# Patient Record
Sex: FEMALE | Race: WHITE | Employment: FULL TIME | URBAN - METROPOLITAN AREA
[De-identification: names, ages, dates, MRNs, and addresses within clinical notes are randomized per-mention and may not be internally consistent; named-entity substitution may affect disease eponyms.]

---

## 2017-04-13 ENCOUNTER — HOSPITAL ENCOUNTER (OUTPATIENT)
Dept: MAMMOGRAPHY | Age: 62
Discharge: HOME | End: 2017-04-13
Payer: COMMERCIAL

## 2017-04-13 DIAGNOSIS — Z12.31 VISIT FOR SCREENING MAMMOGRAM: ICD-10-CM

## 2017-04-13 PROCEDURE — 77067 SCR MAMMO BI INCL CAD: CPT

## 2017-12-18 LAB
CREATININE, EXTERNAL: 0.83
LDL-C, EXTERNAL: 65

## 2019-01-24 ENCOUNTER — OFFICE VISIT (OUTPATIENT)
Dept: FAMILY MEDICINE CLINIC | Age: 64
End: 2019-01-24

## 2019-01-24 VITALS
OXYGEN SATURATION: 99 % | DIASTOLIC BLOOD PRESSURE: 77 MMHG | TEMPERATURE: 98.9 F | RESPIRATION RATE: 16 BRPM | SYSTOLIC BLOOD PRESSURE: 139 MMHG | BODY MASS INDEX: 23.74 KG/M2 | WEIGHT: 134 LBS | HEIGHT: 63 IN | HEART RATE: 92 BPM

## 2019-01-24 DIAGNOSIS — F41.1 GAD (GENERALIZED ANXIETY DISORDER): Primary | ICD-10-CM

## 2019-01-24 DIAGNOSIS — R30.0 DYSURIA: ICD-10-CM

## 2019-01-24 DIAGNOSIS — Z76.89 ENCOUNTER TO ESTABLISH CARE: ICD-10-CM

## 2019-01-24 DIAGNOSIS — I10 ESSENTIAL HYPERTENSION: ICD-10-CM

## 2019-01-24 LAB
BILIRUB UR QL STRIP: NEGATIVE
GLUCOSE UR-MCNC: NEGATIVE MG/DL
KETONES P FAST UR STRIP-MCNC: NEGATIVE MG/DL
PH UR STRIP: 6.5 [PH] (ref 4.6–8)
PROT UR QL STRIP: NEGATIVE
SP GR UR STRIP: 1.01 (ref 1–1.03)
UA UROBILINOGEN AMB POC: NORMAL (ref 0.2–1)
URINALYSIS CLARITY POC: CLEAR
URINALYSIS COLOR POC: NORMAL
URINE BLOOD POC: NEGATIVE
URINE LEUKOCYTES POC: NEGATIVE
URINE NITRITES POC: NEGATIVE

## 2019-01-24 RX ORDER — LISINOPRIL 5 MG/1
TABLET ORAL
COMMUNITY
Start: 2019-01-23

## 2019-01-24 RX ORDER — BUSPIRONE HYDROCHLORIDE 5 MG/1
5 TABLET ORAL 2 TIMES DAILY
Qty: 60 TAB | Refills: 1 | Status: SHIPPED | OUTPATIENT
Start: 2019-01-24

## 2019-01-24 RX ORDER — FLUTICASONE PROPIONATE 50 MCG
2 SPRAY, SUSPENSION (ML) NASAL DAILY
COMMUNITY

## 2019-01-24 RX ORDER — GLUCOSAMINE SULFATE 1500 MG
POWDER IN PACKET (EA) ORAL DAILY
COMMUNITY

## 2019-01-24 RX ORDER — ALENDRONATE SODIUM 70 MG/1
TABLET ORAL
COMMUNITY
Start: 2019-01-19

## 2019-01-24 RX ORDER — BUSPIRONE HYDROCHLORIDE 5 MG/1
5 TABLET ORAL DAILY
COMMUNITY
Start: 2019-01-23 | End: 2019-01-24 | Stop reason: SDUPTHER

## 2019-01-24 RX ORDER — DIAZEPAM 5 MG/1
5 TABLET ORAL
COMMUNITY
Start: 2018-11-29

## 2019-01-24 RX ORDER — GLUCOSAMINE SULFATE 1500 MG
POWDER IN PACKET (EA) ORAL DAILY
COMMUNITY
End: 2019-01-24

## 2019-01-24 RX ORDER — NITROFURANTOIN 25; 75 MG/1; MG/1
CAPSULE ORAL
COMMUNITY
Start: 2018-10-30 | End: 2019-01-24 | Stop reason: ALTCHOICE

## 2019-01-24 NOTE — PATIENT INSTRUCTIONS
Learning About Generalized Anxiety Disorder  What is generalized anxiety disorder? We all worry. It's a normal part of life. But when you have generalized anxiety disorder, you worry about lots of things and have a hard time stopping your worry. This worry or anxiety interferes with your relationships, work, and life. What causes it? The cause is not known. But it may be passed down through families. What are the symptoms? You may feel anxious or worry most days about things like work, relationships, health, or money. You may worry about things that are unlikely to happen. You find it hard to stop or control the worry. Because you worry a lot and try hard to stop worrying, you may feel restless, tired, tense, or cranky. You may also find it hard to think or sleep. And you may have headaches or an upset stomach. How is it diagnosed? Your doctor will ask about your health and how often you worry or feel anxious. He or she may ask about other symptoms, like whether you:  · Feel restless. · Feel tired. · Have a hard time thinking or feel that your mind goes blank. · Feel cranky. · Have tense muscles. · Have sleep problems. A physical exam and tests can help make sure that your symptoms aren't caused by a different condition, such as a thyroid problem. How is it treated? Counseling and medicine can both work to treat anxiety. The two are often used along with lifestyle changes. Cognitive-behavioral therapy (CBT) is a type of counseling that's used to help treat anxiety. In CBT, you learn how to notice and replace thoughts that make you feel worried. It also can help you learn how to relax when you worry. Medicines can help. These medicines are often also used for depression. Selective serotonin reuptake inhibitors (SSRIs) are often tried first. But there are other medicines that your doctor may use. You may need to try a few medicines to find one that works well.   Many people feel better by getting regular exercise, eating healthy meals, and getting good sleep. Mindfulness--focusing on things that happen in the present moment--also can help reduce your anxiety. What can you expect when you have it? Having anxiety can be upsetting. Some people might feel less worried and stressed after a couple of months of treatment. But for other people, it might take longer to feel better. Reaching out to people for help is important. Try not to isolate yourself. Let your family and friends help you. Find someone you can trust and confide in. Talk to that person. When you know what anxiety is--and how you can get help for it--you can start to learn new ways of thinking. This can help you cope and work through your anxiety. Follow-up care is a key part of your treatment and safety. Be sure to make and go to all appointments, and call your doctor if you are having problems. It's also a good idea to know your test results and keep a list of the medicines you take. Where can you learn more? Go to http://ana lilia-zack.info/. Enter G110 in the search box to learn more about \"Learning About Generalized Anxiety Disorder. \"  Current as of: September 11, 2018  Content Version: 11.9  © 8838-5388 [employer]. Care instructions adapted under license by [employer] (which disclaims liability or warranty for this information). If you have questions about a medical condition or this instruction, always ask your healthcare professional. [address] any warranty or liability for your use of this information.

## 2019-01-24 NOTE — PROGRESS NOTES
[address] [facility] Note  HPI:   Lulu Del Real is a 58 y.o. female who presents to establish care. Previous provider: Dr. Niharika Nolasco. Switching PCP as provider is leaving. Chief Complaint   Patient presents with    New Patient     establish care. previous PCP is leaving office. seen by Dr. Rosie Paez  She is an 58 y.o. female who presents with new evaluation and treatment of of anxiety disorder. She has the following anxiety symptoms: racing thoughts, physical tightness, generalized worry. Onset of symptoms was lifelong, worsened 1-2 years ago, stable since this time. She denies current suicidal and homicidal ideation. Family history significant for anxiety and alcoholism. Possible organic causes contributing are: none. Risk factors: negative life event estranged from son not by her choice. Previous treatment includes buspar, Xanax, Valium, lexapro and group therapy and individual therapy. She complains of the following side effects from the treatment: Reports grand mal seizure from Lexapro, she is fearful to take other SSRI. She has most recently been taking Buspar 5 mg daily, Valium 5 mg almost every night. Cardiovascular Review:  The patient has hypertension. Diet and Lifestyle: not attempting to follow a low fat, low cholesterol diet  Home BP Monitoring: is not measured at home. Pertinent ROS: taking medications as instructed, no medication side effects noted, no TIA's, no chest pain on exertion, no dyspnea on exertion, no swelling of ankles. Recent lab work at the end of 2018. She is agreeable to release of records. OBGYN is Dr. Amadou Webber. Hysterectomy for uterine fibroids. No history of abnormal pap smears. Recurrent UTI's. Evaluated by Dr. Andrew Belle in 2017, negative work-up. Some mild generalized discomfort today.       Current Outpatient Medications   Medication Sig Dispense Refill    lisinopril (PRINIVIL, ZESTRIL) 5 mg tablet       alendronate (FOSAMAX) 70 mg tablet every seven (7) days.  diazePAM (VALIUM) 5 mg tablet 5 mg daily as needed.  cholecalciferol (VITAMIN D3) 1,000 unit cap Take  by mouth daily.  fluticasone (FLONASE ALLERGY RELIEF) 50 mcg/actuation nasal spray 2 Sprays by Both Nostrils route daily.  busPIRone (BUSPAR) 5 mg tablet Take 1 Tab by mouth two (2) times a day. 60 Tab 1      Allergies   Allergen Reactions    Ciprofibrate Other (comments)     headaches    Lexapro [Escitalopram Oxalate] Seizures    Pcn [Penicillins] Hives    Sulfa (Sulfonamide Antibiotics) Other (comments)     Burning tongue      There is no problem list on file for this patient. Past Medical History:   Diagnosis Date    Anxiety     Foot drop     right foot. Eval by Dr. Jaycee Montero.  Hypertension     Macular degeneration     Osteopenia     history of osteopenic.  Recurrent UTI     Stress fracture of foot       Past Surgical History:   Procedure Laterality Date    HX  SECTION      x2    HX GYN      hysterectomy     HX GYN      Bladder sling      No LMP recorded. Patient has had a hysterectomy.    Family History   Problem Relation Age of Onset    Alcohol abuse Sister     Other Son         rare disease that causes nephrotic syndrome    Obesity Sister     Uterine Cancer Sister     Anxiety Father       Social History     Socioeconomic History    Marital status:      Spouse name: Not on file    Number of children: Not on file    Years of education: Not on file    Highest education level: Not on file   Social Needs    Financial resource strain: Not on file    Food insecurity - worry: Not on file    Food insecurity - inability: Not on file   [employer] needs - medical: Not on file   [employer] needs - non-medical: Not on file   Occupational History    Not on file   Tobacco Use    Smoking status: Never Smoker    Smokeless tobacco: Never Used   Substance and Sexual Activity    Alcohol use: Yes     Alcohol/week: 0.6 oz     Types: 1 Glasses of wine per week     Frequency: 4 or more times a week     Drinks per session: 1 or 2     Binge frequency: Never     Comment: daily    Drug use: No    Sexual activity: No   Other Topics Concern    Not on file   Social History Narrative    Not on file        ROS:   Review of Systems   Constitutional: Negative for chills, diaphoresis, fever, malaise/fatigue and weight loss. HENT: Negative for congestion, ear pain, hearing loss, sinus pain, sore throat and tinnitus. Eyes: Negative for blurred vision and double vision. Respiratory: Negative for shortness of breath. Cardiovascular: Negative for chest pain, palpitations and leg swelling. Gastrointestinal: Negative for abdominal pain, blood in stool, constipation, diarrhea, heartburn, melena, nausea and vomiting. Genitourinary: Positive for frequency. Negative for dysuria, hematuria and urgency. Musculoskeletal: Negative for joint pain and myalgias. Skin: Negative for itching and rash. Neurological: Negative for dizziness, tingling, weakness and headaches. Endo/Heme/Allergies: Negative for polydipsia. Psychiatric/Behavioral: Negative for depression, substance abuse and suicidal ideas. The patient is nervous/anxious. The patient does not have insomnia. Physical Exam:     Visit Vitals  /77 (BP 1 Location: Left arm, BP Patient Position: Sitting)   Pulse 92   Temp 98.9 °F (37.2 °C) (Oral)   Resp 16   Ht 5' 2.5\" (1.588 m)   Wt 134 lb (60.8 kg)   SpO2 99%   BMI 24.12 kg/m²        Vitals and Nurse Documentation reviewed. Physical Exam   Constitutional: She is oriented to person, place, and time and well-developed, well-nourished, and in no distress. HENT:   Head: Normocephalic and atraumatic. Neck: Normal range of motion. Carotid bruit is not present. No tracheal deviation present. No thyromegaly present.    Cardiovascular: Normal rate, regular rhythm, normal heart sounds and intact distal pulses. Exam reveals no gallop and no friction rub. No murmur heard. Pulmonary/Chest: Effort normal and breath sounds normal. No respiratory distress. She has no wheezes. She has no rales. She exhibits no tenderness. Musculoskeletal: She exhibits no edema. Lymphadenopathy:     She has no cervical adenopathy. Neurological: She is alert and oriented to person, place, and time. Gait normal.   Skin: Skin is warm and dry. Psychiatric: Mood, memory, affect and judgment normal.   Nursing note and vitals reviewed. Assessment/ Plan:   [employer] were discussed including hours of operation, on-call providers, and MyChart. Diagnoses and all orders for this visit:    1. SHWETHA (generalized anxiety disorder): Long standing history of SHWETHA. On Buspar 5 mg daily, Valium 5 mg nightly. History of seizure from previous SSRI therapy. Side effects and long term AE of benzodiazapine use reviewed. She reports small glass of wine nightly, advised not to take with valium and possible avoid ETOH use generally as this could be contributing to symptoms. She is agreeable to increase Buspar to 5 mg BID, try to wean nightly Valium. Referral to psychiatric with previous reaction to SSRI therapy. -     busPIRone (BUSPAR) 5 mg tablet; Take 1 Tab by mouth two (2) times a day. -     REFERRAL TO PSYCHIATRY    2. Encounter to establish care: Return for CPE. Will obtain records from previous PCP to clarify health maintenance. 3. Essential hypertension: Stable, at goal today < 140/90. Continue current therapy. 4. Dysuria: No signs of UTI on UA. Encouraged increase hydration, avoidance of bladder irritants. Encouraged follow-up with GYN, differentials include vaginal atrophy. -     AMB POC URINALYSIS DIP STICK AUTO W/O MICRO      Follow-up Disposition:  Return in about 8 weeks (around 3/21/2019) for Follow-up, Anxiety.      Discussed expected course/resolution/complications of diagnosis in detail with patient.    Medication risks/benefits/costs/interactions/alternatives discussed with patient.    Pt was given an after visit summary which includes diagnoses, current medications & vitals.  Pt expressed understanding with the diagnosis and plan

## 2019-01-30 PROBLEM — M81.0 OSTEOPOROSIS: Status: ACTIVE | Noted: 2017-03-09

## 2022-03-18 PROBLEM — M81.0 OSTEOPOROSIS: Status: ACTIVE | Noted: 2017-03-09

## 2024-08-06 ENCOUNTER — HOSPITAL ENCOUNTER (OUTPATIENT)
Facility: HOSPITAL | Age: 69
Setting detail: SPECIMEN
Discharge: HOME | End: 2024-08-09

## 2024-08-06 PROCEDURE — 86480 TB TEST CELL IMMUN MEASURE: CPT

## 2024-08-06 PROCEDURE — 36415 COLL VENOUS BLD VENIPUNCTURE: CPT
